# Patient Record
Sex: FEMALE | Race: WHITE | Employment: FULL TIME | URBAN - METROPOLITAN AREA
[De-identification: names, ages, dates, MRNs, and addresses within clinical notes are randomized per-mention and may not be internally consistent; named-entity substitution may affect disease eponyms.]

---

## 2020-11-25 ENCOUNTER — RECORDS - HEALTHEAST (OUTPATIENT)
Dept: LAB | Facility: CLINIC | Age: 50
End: 2020-11-25

## 2020-11-26 LAB — BACTERIA SPEC CULT: NO GROWTH

## 2020-11-27 LAB — BACTERIA SPEC CULT: NORMAL

## 2021-02-04 ENCOUNTER — OFFICE VISIT (OUTPATIENT)
Dept: INTERNAL MEDICINE | Facility: CLINIC | Age: 51
End: 2021-02-04
Payer: COMMERCIAL

## 2021-02-04 VITALS
TEMPERATURE: 98.7 F | RESPIRATION RATE: 16 BRPM | WEIGHT: 174.8 LBS | SYSTOLIC BLOOD PRESSURE: 145 MMHG | HEART RATE: 81 BPM | DIASTOLIC BLOOD PRESSURE: 99 MMHG | OXYGEN SATURATION: 99 %

## 2021-02-04 DIAGNOSIS — Z17.0 MALIGNANT NEOPLASM OF LEFT BREAST IN FEMALE, ESTROGEN RECEPTOR POSITIVE, UNSPECIFIED SITE OF BREAST (H): ICD-10-CM

## 2021-02-04 DIAGNOSIS — F41.9 ANXIETY: ICD-10-CM

## 2021-02-04 DIAGNOSIS — R51.9 CHRONIC INTRACTABLE HEADACHE, UNSPECIFIED HEADACHE TYPE: Primary | ICD-10-CM

## 2021-02-04 DIAGNOSIS — R55 SYNCOPE, UNSPECIFIED SYNCOPE TYPE: ICD-10-CM

## 2021-02-04 DIAGNOSIS — R11.0 NAUSEA: ICD-10-CM

## 2021-02-04 DIAGNOSIS — C50.912 MALIGNANT NEOPLASM OF LEFT BREAST IN FEMALE, ESTROGEN RECEPTOR POSITIVE, UNSPECIFIED SITE OF BREAST (H): ICD-10-CM

## 2021-02-04 DIAGNOSIS — G89.29 CHRONIC INTRACTABLE HEADACHE, UNSPECIFIED HEADACHE TYPE: Primary | ICD-10-CM

## 2021-02-04 PROCEDURE — 99204 OFFICE O/P NEW MOD 45 MIN: CPT | Performed by: INTERNAL MEDICINE

## 2021-02-04 RX ORDER — ONDANSETRON 4 MG/1
4 TABLET, ORALLY DISINTEGRATING ORAL EVERY 8 HOURS PRN
Qty: 18 TABLET | Refills: 3 | Status: SHIPPED | OUTPATIENT
Start: 2021-02-04

## 2021-02-04 RX ORDER — AMLODIPINE BESYLATE 5 MG/1
5 TABLET ORAL DAILY
Qty: 30 TABLET | Refills: 2 | Status: SHIPPED | OUTPATIENT
Start: 2021-02-04 | End: 2021-03-01

## 2021-02-04 RX ORDER — ELETRIPTAN HYDROBROMIDE 20 MG/1
20 TABLET, FILM COATED ORAL
Qty: 12 TABLET | Refills: 3 | Status: SHIPPED | OUTPATIENT
Start: 2021-02-04

## 2021-02-04 ASSESSMENT — ENCOUNTER SYMPTOMS: HEADACHES: 1

## 2021-02-04 NOTE — PROGRESS NOTES
Assessment & Plan     Chronic intractable headache, unspecified headache type  Today was my first appointment with this patient. Prior to today she'd gotten her medical care with the [facility] and we do have pages and pages of information from care everywhere which is reviewed to some degree. The patient is somewhat distraught today as she has had a real plethora of unrelated matters to discuss and these don't easily tie together. She seems overwhelmed and is here with her fiancee. We talked today for around 30 minutes. She's going to try taking Amlodipine [Norvasc] from now on as a headache prophylactic therapy as well as hoping this will normalized her blood pressure. She's also going to use Relpax (Eletriptan hydrobromide) for the worst migraine headaches and Ondansetron (ZOFRAN) for her nausea. I am recommending she schedule a follow up appointment with me in about 3 weeks to further address the many different issues we addressed today   - amLODIPine (NORVASC) 5 MG tablet; Take 1 tablet (5 mg) by mouth daily  - eletriptan (RELPAX) 20 MG tablet; Take 1 tablet (20 mg) by mouth at onset of headache for migraine May repeat in 2 hours. Max 4 tablets/24 hours.    Nausea  Just in case, prescription sent  - ondansetron (ZOFRAN-ODT) 4 MG ODT tab; Take 1 tablet (4 mg) by mouth every 8 hours as needed for nausea    Anxiety  We aren't ready to really do justice to this topic yet., she clearly has a lot going on and we will address at the follow up appointment     Syncope, unspecified syncope type  See as detailed below. This has been with also some symptoms of presyncope and it sure sounds like situational syncope of uncertain type , mainly provoked by laughing. Again, will further review at follow up appointment      Malignant neoplasm of left breast in female, estrogen receptor positive, unspecified site of breast (H)  Malignant neoplasm of left breast in female, estrogen receptor negative, unspecified site  of breast (HC)    Noted as a point of historical importance     Hypertension - I did not make this diagnosis today but it MAY be appropriate . Lets revisit this issue at follow up appointment     Review of external notes as documented above       30 minutes spent on the date of the encounter doing chart review, history and exam, documentation and further activities as noted above      Return in about 3 weeks (around 2021).    David Womack MD  [facility] JOVANNI Lugo is a 50 year old who presents to clinic today for the following health issues   Encounter Diagnoses   Name Primary?     Chronic intractable headache, unspecified headache type Yes     Nausea      Anxiety      Syncope, unspecified syncope type      Malignant neoplasm of left breast in female, estrogen receptor positive, unspecified site of breast (H)      accompanied by her fiancee:    Headache     History of Present Illness       Hypertension: She presents for follow up of hypertension.  She does check blood pressure  regularly outside of the clinic. Outside blood pressures have been over 140/90. She does not follow a low salt diet.     She eats 0-1 servings of fruits and vegetables daily.She consumes 1 sweetened beverage(s) daily.   She is taking medications regularly.       Chief Complaint   Patient presents with     Hypertension     Headache   we covered a great many different issues today     Dad  last new years jef 2020  Panic attacks since then  Meaning to come in but has postponed this again and again   Laughing provokes lightheadedness , since dad passed -  - . Since then several more events   Last  one time had a spell of lightheadedness without laughing   Patient had Coronavirus (COVID-19) diagnosed last .  She has had a persistent chest tightness even since then  Blood pressure elevation is new  blood pressure   BP Readings from Last 3 Encounters:   21 (!) 145/99     Life long  history of migraine headaches  , controlled with gluten free diet , about 5 years now. This was directed throughout a Chiropractor office.    she's a survivor of breast cancer now about 3 years out, see outside medical records with [facility]     Migraine headaches ? Since childhood ? Said to have migraine headaches  , tried at one point Topamax (Topiramate) or SUMAtriptan (IMITREX) injections, very sensitive to narcotic pain medications and sometimes got these in emergency rooms, had tried iv ibuprofen     So still having some more mild headache but not the migraine headaches  ,    Anxiety ?she took anxiety medication after her divorce 20 years ago. Took off of medications many years ago.     Headaches - terrible  Recent jaw pains  Sees chiropractor 1-2 times per week , missed due to dental work.  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      Constitutional, HEENT, cardiovascular, pulmonary, gi and gu systems are negative, except as otherwise noted.      Objective    BP (!) 145/99   Pulse 81   Temp 98.7  F (37.1  C) (Oral)   Resp 16   Wt 79.3 kg (174 lb 12.8 oz)   SpO2 99%   There is no height or weight on file to calculate BMI.  Physical Exam   GENERAL: healthy, alert and in mild distress of an emotional nature , answers all questions appropriately, talkative and appropriate, normal grooming and affect   EYES: Eyes grossly normal to inspection, PERRL and conjunctivae and sclerae normal  HENT: ear canals and TM's normal, nose and mouth without ulcers or lesions  NECK: no adenopathy, no asymmetry, masses, or scars and thyroid normal to palpation  RESP: lungs clear to auscultation - no rales, rhonchi or  wheezes  CV: regular rate and rhythm, normal S1 S2, no S3 or S4, no murmur, click or rub, no peripheral edema and peripheral pulses strong  MS: no gross musculoskeletal defects noted, no edema  NEURO: Normal strength and tone, mentation intact and speech normal  PSYCH: mentation appears normal, affect normal/bright    No orders of the defined types were placed in this encounter.        I spent a total of 30 minutes face-to-face with Brittney Smith during today's office visit.  Over 50% of this time was spent counseling the patient and/or coordinating care regarding   Encounter Diagnoses   Name Primary?     Chronic intractable headache, unspecified headache type Yes     Nausea      Anxiety      Syncope, unspecified syncope type      Malignant neoplasm of left breast in female, estrogen receptor positive, unspecified site of breast (H)     .  See note for details.

## 2021-02-26 DIAGNOSIS — R51.9 CHRONIC INTRACTABLE HEADACHE, UNSPECIFIED HEADACHE TYPE: ICD-10-CM

## 2021-02-26 DIAGNOSIS — G89.29 CHRONIC INTRACTABLE HEADACHE, UNSPECIFIED HEADACHE TYPE: ICD-10-CM

## 2021-02-28 RX ORDER — AMLODIPINE BESYLATE 5 MG/1
TABLET ORAL
Qty: 30 TABLET | Refills: 2 | OUTPATIENT
Start: 2021-02-28

## 2021-03-01 DIAGNOSIS — R51.9 CHRONIC INTRACTABLE HEADACHE, UNSPECIFIED HEADACHE TYPE: ICD-10-CM

## 2021-03-01 DIAGNOSIS — G89.29 CHRONIC INTRACTABLE HEADACHE, UNSPECIFIED HEADACHE TYPE: ICD-10-CM

## 2021-03-01 RX ORDER — AMLODIPINE BESYLATE 5 MG/1
5 TABLET ORAL DAILY
Qty: 30 TABLET | Refills: 1 | Status: SHIPPED | OUTPATIENT
Start: 2021-03-01

## 2021-03-01 NOTE — TELEPHONE ENCOUNTER
Patient needs her BP medication she is schedule for 03/16/21 will be out before that.  Kayli Grey,

## 2021-03-24 ENCOUNTER — RECORDS - HEALTHEAST (OUTPATIENT)
Dept: LAB | Facility: CLINIC | Age: 51
End: 2021-03-24

## 2021-03-24 LAB
CHOLEST SERPL-MCNC: 209 MG/DL
FASTING STATUS PATIENT QL REPORTED: ABNORMAL
HDLC SERPL-MCNC: 55 MG/DL
LDLC SERPL CALC-MCNC: 141 MG/DL
TRIGL SERPL-MCNC: 64 MG/DL

## 2021-03-25 LAB — 25(OH)D3 SERPL-MCNC: 16.5 NG/ML (ref 30–80)

## 2021-05-25 ENCOUNTER — RECORDS - HEALTHEAST (OUTPATIENT)
Dept: ADMINISTRATIVE | Facility: CLINIC | Age: 51
End: 2021-05-25